# Patient Record
Sex: FEMALE | Race: WHITE | HISPANIC OR LATINO | Employment: FULL TIME | URBAN - METROPOLITAN AREA
[De-identification: names, ages, dates, MRNs, and addresses within clinical notes are randomized per-mention and may not be internally consistent; named-entity substitution may affect disease eponyms.]

---

## 2023-11-06 ENCOUNTER — APPOINTMENT (OUTPATIENT)
Dept: PRIMARY CARE | Facility: CLINIC | Age: 33
End: 2023-11-06
Payer: COMMERCIAL

## 2025-04-29 ASSESSMENT — PROMIS GLOBAL HEALTH SCALE
RATE_SOCIAL_SATISFACTION: GOOD
EMOTIONAL_PROBLEMS: SOMETIMES
RATE_AVERAGE_PAIN: 1
CARRYOUT_PHYSICAL_ACTIVITIES: COMPLETELY
CARRYOUT_SOCIAL_ACTIVITIES: GOOD
RATE_MENTAL_HEALTH: GOOD
RATE_QUALITY_OF_LIFE: GOOD
RATE_GENERAL_HEALTH: GOOD
RATE_PHYSICAL_HEALTH: FAIR
RATE_AVERAGE_FATIGUE: MODERATE

## 2025-05-01 ENCOUNTER — APPOINTMENT (OUTPATIENT)
Dept: PRIMARY CARE | Facility: CLINIC | Age: 35
End: 2025-05-01
Payer: COMMERCIAL

## 2025-05-01 VITALS
SYSTOLIC BLOOD PRESSURE: 98 MMHG | OXYGEN SATURATION: 98 % | BODY MASS INDEX: 24.55 KG/M2 | WEIGHT: 130 LBS | DIASTOLIC BLOOD PRESSURE: 60 MMHG | HEART RATE: 95 BPM | HEIGHT: 61 IN

## 2025-05-01 DIAGNOSIS — Z13.6 SCREENING FOR CARDIOVASCULAR CONDITION: ICD-10-CM

## 2025-05-01 DIAGNOSIS — Z13.1 SCREENING FOR DIABETES MELLITUS: ICD-10-CM

## 2025-05-01 DIAGNOSIS — Z13.9 SCREENING FOR CONDITION: ICD-10-CM

## 2025-05-01 DIAGNOSIS — Z00.00 ENCOUNTER FOR PREVENTIVE HEALTH EXAMINATION: Primary | ICD-10-CM

## 2025-05-01 DIAGNOSIS — Z11.59 ENCOUNTER FOR HEPATITIS C SCREENING TEST FOR LOW RISK PATIENT: ICD-10-CM

## 2025-05-01 DIAGNOSIS — E55.9 VITAMIN D DEFICIENCY: ICD-10-CM

## 2025-05-01 PROCEDURE — 1036F TOBACCO NON-USER: CPT | Performed by: STUDENT IN AN ORGANIZED HEALTH CARE EDUCATION/TRAINING PROGRAM

## 2025-05-01 PROCEDURE — 99395 PREV VISIT EST AGE 18-39: CPT | Performed by: STUDENT IN AN ORGANIZED HEALTH CARE EDUCATION/TRAINING PROGRAM

## 2025-05-01 PROCEDURE — 3008F BODY MASS INDEX DOCD: CPT | Performed by: STUDENT IN AN ORGANIZED HEALTH CARE EDUCATION/TRAINING PROGRAM

## 2025-05-01 NOTE — PROGRESS NOTES
"Subjective   Patient ID: Asha Lion is a 35 y.o. female who presents for Annual Exam.    HPI  Diet is well balanced.  Exercises regularly.  PAP is up to date.  Has a gynecologist  Vaccines are up to date.  Dental exam is up to date.  Vision exam is up to date.  Patient is fasting for labs today.  Social: Tobacco use- nonsmoker      Alcohol use- social, weekends  No concerns today other than her weight seems to be trending up over the last couple of years without any real changes to her diet.  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    Review of Systems  All pertinent positive symptoms are included in the history of present illness.    All other systems have been reviewed and are negative and noncontributory to this patient's current ailments.     Social History     Tobacco Use    Smoking status: Never    Smokeless tobacco: Never   Substance Use Topics    Alcohol use: Not on file      Surgical History[1]   Allergies[2]     Current Medications[3]     Problem List[4]   Immunization History   Administered Date(s) Administered    COVID-19, mRNA, LNP-S, PF, 30 mcg/0.3 mL dose 01/06/2021, 01/27/2021, 10/19/2021    Pfizer COVID-19 vaccine, bivalent, age 12 years and older (30 mcg/0.3 mL) 10/13/2022       Objective   VITALS  BP 98/60 (BP Location: Left arm, Patient Position: Sitting, BP Cuff Size: Adult)   Pulse 95   Ht 1.549 m (5' 1\")   Wt 59 kg (130 lb)   SpO2 98%   BMI 24.56 kg/m²      Physical Exam  CONSTITUTIONAL - well nourished, well developed, looks like stated age, in no acute distress, not ill-appearing  SKIN - normal skin color and pigmentation, normal skin turgor without rash, lesions, or nodules visualized  HEAD - no trauma, normocephalic  EYES - pupils are equal and reactive to light, extraocular muscles are intact, and normal external exam  ENT - TM's intact, no injection, no signs of infection, uvula " midline, normal tongue movement and throat normal, no exudate, nasal passage without discharge and patent  NECK - supple without rigidity, no neck mass was observed, no thyromegaly or thyroid nodules  CHEST - clear to auscultation, no wheezing, no crackles and no rales, good effort  CARDIAC - regular rate and regular rhythm, no skipped beats, no murmur  ABDOMEN - no organomegaly, soft, nontender, nondistended, normal bowel sounds, no guarding/rebound/rigidity  EXTREMITIES - no edema, no deformities  NEUROLOGICAL - normal gait, normal balance, normal motor, no ataxia, DTRs equal and symmetrical; alert, oriented and no focal signs  PSYCHIATRIC - alert, pleasant and cordial, age-appropriate  IMMUNOLOGIC - no cervical lymphadenopathy     Assessment/Plan   Diagnoses and all orders for this visit:  Encounter for preventive health examination  A complete history and physical was performed today.  Vaccines are up to date.  PAP is up to date.  Fasting labs ordered today include:  -     CBC; Future  -     Comprehensive Metabolic Panel; Future  -     Hemoglobin A1C; Future  -     Lipid Panel; Future  Screening for condition  -     CBC; Future  -     Comprehensive Metabolic Panel; Future  Screening for diabetes mellitus  -     Hemoglobin A1C; Future  Screening for cardiovascular condition  -     Lipid Panel; Future  Encounter for hepatitis C screening test for low risk patient  -     Hepatitis C Antibody; Future  Vitamin D deficiency  -     Vitamin D 25-Hydroxy,Total (for eval of Vitamin D levels); Future    Return in 1 year and sooner as needed         [1]   Past Surgical History:  Procedure Laterality Date     SECTION, CLASSIC  2018     Section    OTHER SURGICAL HISTORY  2018    Dental Surgery   [2]   Allergies  Allergen Reactions    Penicillin G Other   [3]   No current outpatient medications on file.     No current facility-administered medications for this visit.   [4] There is no problem list  on file for this patient.

## 2025-05-02 LAB
25(OH)D3+25(OH)D2 SERPL-MCNC: 27 NG/ML (ref 30–100)
ALBUMIN SERPL-MCNC: 4.4 G/DL (ref 3.6–5.1)
ALP SERPL-CCNC: 38 U/L (ref 31–125)
ALT SERPL-CCNC: 6 U/L (ref 6–29)
ANION GAP SERPL CALCULATED.4IONS-SCNC: 8 MMOL/L (CALC) (ref 7–17)
AST SERPL-CCNC: 16 U/L (ref 10–30)
BILIRUB SERPL-MCNC: 0.7 MG/DL (ref 0.2–1.2)
BUN SERPL-MCNC: 16 MG/DL (ref 7–25)
CALCIUM SERPL-MCNC: 9.1 MG/DL (ref 8.6–10.2)
CHLORIDE SERPL-SCNC: 106 MMOL/L (ref 98–110)
CHOLEST SERPL-MCNC: 178 MG/DL
CHOLEST/HDLC SERPL: 4.1 (CALC)
CO2 SERPL-SCNC: 26 MMOL/L (ref 20–32)
CREAT SERPL-MCNC: 0.72 MG/DL (ref 0.5–0.97)
EGFRCR SERPLBLD CKD-EPI 2021: 110 ML/MIN/1.73M2
ERYTHROCYTE [DISTWIDTH] IN BLOOD BY AUTOMATED COUNT: 12.7 % (ref 11–15)
EST. AVERAGE GLUCOSE BLD GHB EST-MCNC: 100 MG/DL
EST. AVERAGE GLUCOSE BLD GHB EST-SCNC: 5.5 MMOL/L
GLUCOSE SERPL-MCNC: 78 MG/DL (ref 65–99)
HBA1C MFR BLD: 5.1 %
HCT VFR BLD AUTO: 41.7 % (ref 35–45)
HCV AB SERPL QL IA: NORMAL
HDLC SERPL-MCNC: 43 MG/DL
HGB BLD-MCNC: 13.9 G/DL (ref 11.7–15.5)
LDLC SERPL CALC-MCNC: 111 MG/DL (CALC)
MCH RBC QN AUTO: 30.6 PG (ref 27–33)
MCHC RBC AUTO-ENTMCNC: 33.3 G/DL (ref 32–36)
MCV RBC AUTO: 91.9 FL (ref 80–100)
NONHDLC SERPL-MCNC: 135 MG/DL (CALC)
PLATELET # BLD AUTO: 163 THOUSAND/UL (ref 140–400)
PMV BLD REES-ECKER: 11.7 FL (ref 7.5–12.5)
POTASSIUM SERPL-SCNC: 4.1 MMOL/L (ref 3.5–5.3)
PROT SERPL-MCNC: 6.9 G/DL (ref 6.1–8.1)
RBC # BLD AUTO: 4.54 MILLION/UL (ref 3.8–5.1)
SODIUM SERPL-SCNC: 140 MMOL/L (ref 135–146)
TRIGL SERPL-MCNC: 127 MG/DL
WBC # BLD AUTO: 4.8 THOUSAND/UL (ref 3.8–10.8)

## 2025-07-20 ASSESSMENT — ENCOUNTER SYMPTOMS
SORE THROAT: 1
COUGH: 1

## 2025-07-21 ENCOUNTER — HOSPITAL ENCOUNTER (OUTPATIENT)
Dept: RADIOLOGY | Facility: CLINIC | Age: 35
Discharge: HOME | End: 2025-07-21
Payer: COMMERCIAL

## 2025-07-21 ENCOUNTER — APPOINTMENT (OUTPATIENT)
Dept: PRIMARY CARE | Facility: CLINIC | Age: 35
End: 2025-07-21
Payer: COMMERCIAL

## 2025-07-21 VITALS
HEART RATE: 99 BPM | OXYGEN SATURATION: 97 % | DIASTOLIC BLOOD PRESSURE: 70 MMHG | TEMPERATURE: 99.2 F | SYSTOLIC BLOOD PRESSURE: 122 MMHG

## 2025-07-21 DIAGNOSIS — J06.9 ACUTE URI: ICD-10-CM

## 2025-07-21 DIAGNOSIS — R05.2 SUBACUTE COUGH: Primary | ICD-10-CM

## 2025-07-21 DIAGNOSIS — J02.9 ACUTE SORE THROAT: ICD-10-CM

## 2025-07-21 DIAGNOSIS — R05.2 SUBACUTE COUGH: ICD-10-CM

## 2025-07-21 LAB — POC RAPID STREP: NEGATIVE

## 2025-07-21 PROCEDURE — 1036F TOBACCO NON-USER: CPT | Performed by: STUDENT IN AN ORGANIZED HEALTH CARE EDUCATION/TRAINING PROGRAM

## 2025-07-21 PROCEDURE — 99214 OFFICE O/P EST MOD 30 MIN: CPT | Performed by: STUDENT IN AN ORGANIZED HEALTH CARE EDUCATION/TRAINING PROGRAM

## 2025-07-21 PROCEDURE — 87880 STREP A ASSAY W/OPTIC: CPT | Performed by: STUDENT IN AN ORGANIZED HEALTH CARE EDUCATION/TRAINING PROGRAM

## 2025-07-21 PROCEDURE — 71046 X-RAY EXAM CHEST 2 VIEWS: CPT | Performed by: RADIOLOGY

## 2025-07-21 PROCEDURE — 71046 X-RAY EXAM CHEST 2 VIEWS: CPT

## 2025-07-21 RX ORDER — AMOXICILLIN AND CLAVULANATE POTASSIUM 875; 125 MG/1; MG/1
875 TABLET, FILM COATED ORAL 2 TIMES DAILY
Qty: 20 TABLET | Refills: 0 | Status: SHIPPED | OUTPATIENT
Start: 2025-07-21 | End: 2025-07-31

## 2025-07-21 RX ORDER — METHYLPREDNISOLONE 4 MG/1
TABLET ORAL
Qty: 21 TABLET | Refills: 0 | Status: SHIPPED | OUTPATIENT
Start: 2025-07-21 | End: 2025-07-27

## 2025-07-21 ASSESSMENT — ENCOUNTER SYMPTOMS
COUGH: 1
SORE THROAT: 1

## 2025-07-21 NOTE — PROGRESS NOTES
Answers submitted by the patient for this visit:  Sore Throat Questionnaire (Submitted on 7/20/2025)  Chief Complaint: Sore throat  Chronicity: recurrent  Onset: in the past 7 days  Progression since onset: gradually worsening  Pain worse on: right  Fever: no fever  pain severity now: moderate  Pain - numeric: 5/10  cough: Yes  ear pain: Yes  Subjective   Patient ID: Asha Lion is a 35 y.o. female who presents for Sick Visit.    Sore Throat   This is a recurrent problem. The current episode started in the past 7 days. The problem has been gradually worsening. The pain is worse on the right side. There has been no fever. The pain is at a severity of 5/10. The pain is moderate. Associated symptoms include coughing and ear pain.      Patient is coming into the office today to discuss signs/symptoms of off-and-on acute symptoms over the past 8 weeks    Stated that approximate 8 weeks ago she noted that she had an upper respiratory infection that resolved spontaneously within a week  She did have a small temperature in the beginning but then ultimately this then increased and lingered off and on    Every couple of weeks she then started to have symptoms come back and become slightly worse    She continues to note dark green/brown phlegm production when she is coughing, fatigue, sore throat, red tonsils with right sided neck pain, ear pain, and even noticing coughing fits at night    She says she does feel slightly better at this time but unfortunately has been noticing that these symptoms continue to linger    Asking to be further eval/treated    Review of Systems   HENT:  Positive for ear pain and sore throat.    Respiratory:  Positive for cough.      All pertinent positive symptoms are included in the history of present illness.    All other systems have been reviewed and are negative and noncontributory to this patient's current ailments.    Objective   /70 (BP Location: Left arm, Patient Position:  Sitting, BP Cuff Size: Adult)   Pulse 99   Temp 37.3 °C (99.2 °F) (Oral)   SpO2 97%     Physical Exam  CONSTITUTIONAL - well nourished, well developed, looks like stated age, in no acute distress, not ill-appearing, and not tired appearing  SKIN - normal skin color and pigmentation, normal skin turgor without rash, lesions, or nodules visualized  HEAD - no trauma, normocephalic  ENT -throat noted to be very erythematous, slight white lesion on the left tonsil, noted neck fullness and lymph nodes noted bilaterally with fullness noted on the right  EYES - normal external exam  CHEST -no distressed breathing, good effort, clear to auscultation bilaterally with no consolidation  EXTREMITIES - no edema, no deformities  NEUROLOGICAL - normal balance, normal motor, no ataxia  PSYCHIATRIC - alert, pleasant and cordial, age-appropriate    Assessment/Plan   We had a long conversation about all of your signs/symptoms and I truly believe that there is some underlying infection    To be fully thorough we did test you for strep but this was negative    I also ordered a chest x-ray due to your symptoms lasting over 2 months to make sure there is no underlying pneumonia that I am missing    We also discussed that our differential is uncertain but can include a bacterial infection, abscess within your throat, pneumonia, viral infection, and others    It was decided together that we will provide Augmentin which is a broad-spectrum antibiotic as well as a Medrol Dosepak due to the length of all of your symptoms    We will continue monitoring closely    Please contact the office within the next 3-5 days to let me know how you are feeling    At any point if you notice worsening or acute sign/symptoms you need to notify me immediately and/or go to nearest emergency room to be acutely evaluated